# Patient Record
Sex: MALE | Race: WHITE | NOT HISPANIC OR LATINO | Employment: FULL TIME | URBAN - METROPOLITAN AREA
[De-identification: names, ages, dates, MRNs, and addresses within clinical notes are randomized per-mention and may not be internally consistent; named-entity substitution may affect disease eponyms.]

---

## 2021-07-27 ENCOUNTER — TELEPHONE (OUTPATIENT)
Dept: MATERNAL FETAL MEDICINE | Facility: CLINIC | Age: 36
End: 2021-07-27

## 2021-07-27 DIAGNOSIS — Z31.448 ENCOUNTER FOR OTHER GENETIC TESTING OF MALE FOR PROCREATIVE MANAGEMENT: Primary | ICD-10-CM

## 2021-07-27 NOTE — TELEPHONE ENCOUNTER
July 27, 2021    Castro called me to further discuss the prenatal whole exome planned for the amniotic fluid obtained at the time of his partner, Garett, procedure.     Prenatal Whole Exome Sequencing through [facility]: We discussed that [facility] looks at the exome or the protein coding parts of the genes to look for genetic changes that may explain the pregnancy's presentation. We reviewed that [facility] will not look at every part of the genome that can cause disease, but is a broader type of test than a targeted panel. The [facility] prenatal whole exome also includes exome-wide copy number variant analysis and is validated to identify aneuploidy, triploidy, and known microdeletions and microduplications. However, exome also has the possibility of identifying clinically significant variants that could be considered incidental. For example, we could identify a clinically significant variant that does not explain the features in the fetus or that is present in a gene associated with recessive disease without a second variant identified. We reviewed that exome reporting is focused based on the clinical information provided.     Prenatal exome through [facility] does NOT report ACMG secondary findings.    [facility] does offer the option to report on any pathogenic or likely pathogenic variants detected in genes unrelated to the fetal clinical features, but that are associated with moderate to severe childhood onset disorders. Many of these disorders, especially those associated with nonsyndromic intellectual disability, neurodevelopmental disorders, and metabolic conditions may not present with ultrasound anomalies.      Turnaround time is ~14 days and can be billed through insurance.     We discussed the different types of results we could get from the testing either type of test. We discussed that we could get a positive result, a negative result, or a variant of uncertain significance (VUS):    Negative: No  disease-causing variants were identified through exome sequencing. A negative result would not completely rule out a possible genetic cause for the clinical presentation in the pregnancy.  We reviewed that ES will not look at every part of the genome that can cause disease.  In addition, not all the exons that are targeted by ES will be covered or evaluated at a high enough level to accurately detect a disease-causing mutation.  There are also limits to the types of disease-causing gene mutations that ES can detect.  It is possible that a genetic cause exists for the prenatal presentation but is not detected by this test.     Positive: A pathogenic variant or variants were identified in a gene or genes through exome sequencing. This type of result provides us with an answer and can also guide conversations about recurrence risk. We discussed recurrence risk if a recessive condition is identified, if an autosomal dominant condition is identified, and if an x-linked condition is identified.     Variant of uncertain significance (VUS): A variant was found in one or multiple genes through exome sequencing, but the lab does not yet have enough information to classify this variant/these variants as pathogenic or benign (not disease causing).     We discussed that samples for Castro and Jania will be included in the analysis to help determine if genetic changes that are found are disease-causing mutations or benign variation.  Only changes that are identified in the fetus will be tested for in [place]. Variants associated with childhood onset disorders will be reported by parent of origin.     After our discussion, Jania and Castro opted to move forward with whole exome sequencing AND opted to include reporting for the childhood onset conditions. Once results have returned, I will call the couple to review all results. We discussed that [facility] can continue to be a resource for the couple in both future  preconception capacities and prenatal capacities.    Castro asked me if the exome will test for things like deafness. He reports that ~25% of his father's side of the family has congenital deafness. We reviewed that ~50% of individuals with congenital deafness have hearing loss due to a specific genetic cause. The prenatal exome could report incidental findings such as a mutation in a gene associated with deafness. However, if the fetus did not inherit a genetic change that Castro carries, it would not be reported. We reviewed the option of carrier screening to gather more information, which Castro would like to consider.    Plan: Castro will have his blood drawn at the [facility] outpatient lab to be sent to [facility] for parental studies.     Mia Dvaila MS, [facility]  Licensed Genetic Counselor  [facility]  Maternal Fetal Medicine  [email or web address]  [phone]

## 2021-07-28 ENCOUNTER — LAB (OUTPATIENT)
Dept: LAB | Facility: CLINIC | Age: 36
End: 2021-07-28
Payer: COMMERCIAL

## 2021-07-28 DIAGNOSIS — Z31.448 ENCOUNTER FOR OTHER GENETIC TESTING OF MALE FOR PROCREATIVE MANAGEMENT: ICD-10-CM

## 2021-07-28 PROCEDURE — 36415 COLL VENOUS BLD VENIPUNCTURE: CPT

## 2021-11-29 LAB
Lab: NORMAL
PERFORMING LABORATORY: NORMAL
SCANNED LAB RESULT: NORMAL
SPECIMEN STATUS: NORMAL
TEST NAME: NORMAL